# Patient Record
Sex: FEMALE | Race: WHITE
[De-identification: names, ages, dates, MRNs, and addresses within clinical notes are randomized per-mention and may not be internally consistent; named-entity substitution may affect disease eponyms.]

---

## 2020-02-14 ENCOUNTER — HOSPITAL ENCOUNTER (EMERGENCY)
Dept: HOSPITAL 74 - JER | Age: 30
Discharge: HOME | End: 2020-02-14
Payer: COMMERCIAL

## 2020-02-14 VITALS — HEART RATE: 76 BPM | DIASTOLIC BLOOD PRESSURE: 70 MMHG | SYSTOLIC BLOOD PRESSURE: 107 MMHG | TEMPERATURE: 97.9 F

## 2020-02-14 VITALS — BODY MASS INDEX: 27.4 KG/M2

## 2020-02-14 DIAGNOSIS — O04.6: ICD-10-CM

## 2020-02-14 DIAGNOSIS — N93.8: Primary | ICD-10-CM

## 2020-02-14 LAB
APPEARANCE UR: CLEAR
BACTERIA # UR AUTO: 13.7 /HPF
BASOPHILS # BLD: 0.4 % (ref 0–2)
BILIRUB UR STRIP.AUTO-MCNC: NEGATIVE MG/DL
CASTS URNS QL MICRO: 0 /LPF (ref 0–8)
COLOR UR: YELLOW
DEPRECATED RDW RBC AUTO: 13.4 % (ref 11.6–15.6)
EOSINOPHIL # BLD: 0.4 % (ref 0–4.5)
EPITH CASTS URNS QL MICRO: 1 /HPF
HCT VFR BLD CALC: 31.7 % (ref 32.4–45.2)
HGB BLD-MCNC: 10.9 GM/DL (ref 10.7–15.3)
KETONES UR QL STRIP: NEGATIVE
LEUKOCYTE ESTERASE UR QL STRIP.AUTO: NEGATIVE
LYMPHOCYTES # BLD: 25.7 % (ref 8–40)
MCH RBC QN AUTO: 30.6 PG (ref 25.7–33.7)
MCHC RBC AUTO-ENTMCNC: 34.3 G/DL (ref 32–36)
MCV RBC: 89.2 FL (ref 80–96)
MONOCYTES # BLD AUTO: 5.7 % (ref 3.8–10.2)
NEUTROPHILS # BLD: 67.8 % (ref 42.8–82.8)
NITRITE UR QL STRIP: NEGATIVE
PH UR: 8.5 [PH] (ref 5–8)
PLATELET # BLD AUTO: 294 K/MM3 (ref 134–434)
PMV BLD: 8 FL (ref 7.5–11.1)
PROT UR QL STRIP: NEGATIVE
PROT UR QL STRIP: NEGATIVE
RBC # BLD AUTO: 24 /HPF (ref 0–4)
RBC # BLD AUTO: 3.56 M/MM3 (ref 3.6–5.2)
SP GR UR: 1.01 (ref 1.01–1.03)
UROBILINOGEN UR STRIP-MCNC: 0.2 MG/DL (ref 0.2–1)
WBC # BLD AUTO: 8.6 K/MM3 (ref 4–10)
WBC # UR AUTO: 0 /HPF (ref 0–5)

## 2020-02-14 PROCEDURE — 3E033NZ INTRODUCTION OF ANALGESICS, HYPNOTICS, SEDATIVES INTO PERIPHERAL VEIN, PERCUTANEOUS APPROACH: ICD-10-PCS

## 2020-02-14 NOTE — PDOC
History of Present Illness





- General


History Source: Patient





- History of Present Illness


Initial Comments: 





20 12:47


30 y/o female with no significant PMHx here w/vaginal bleeding since Wednesday 

with golf ball sized clots 


S/p Misoprostol pill  last Friday/Saturday for 7 week confirmed 

pregnancy.


No fevers/chills.


Mild abdominal cramping.











<Cammie Fonseca - Last Filed: 02/15/20 08:18>





<Joanne oGodman - Last Filed: 02/15/20 10:20>





- General


Chief Complaint: Vaginal Bleeding


Stated Complaint: VAGINAL BLEEDING


Time Seen by Provider: 20 11:43





Past History





- Past Medical History


COPD: No





- Surgical History


Cholecystectomy: Yes





- Psycho Social/Smoking Cessation Hx


Smoking History: Never smoked


Have you smoked in the past 12 months: No


Hx Alcohol Use: No


Drug/Substance Use Hx: No


Substance Use Type: None





<Cammie Fonseca - Last Filed: 02/15/20 08:18>





<Joanne Goodman - Last Filed: 02/15/20 10:20>





- Past Medical History


Allergies/Adverse Reactions: 


 Allergies











Allergy/AdvReac Type Severity Reaction Status Date / Time


 


No Known Allergies Allergy   Verified 20 11:30














**Review of Systems





- Review of Systems


Constitutional: No: Chills, Fever


Respiratory: No: Cough, Shortness of Breath


Cardiac (ROS): No: Chest Pain, Lightheadedness, Palpitations, Syncope


ABD/GI: No: Constipated, Diarrhea, Nausea, Vomiting





<Cammie Fonseca - Last Filed: 02/15/20 08:18>





- Review of Systems


Able to Perform ROS?: Yes


ABD/GI: Yes: Abdominal cramping


: Yes: Symptoms Reported, See HPI, Pain, Other (vaginal bleeding)


Musculoskeletal: Yes: Back Pain


Integumentary: No: Change in Color, Pallor, Rash


Neurological: No: Headache


Hematologic/Lymphatic: No: Anemia, Blood Clots, Easy Bleeding, Easy Bruising, 

Bleeding Diathesis


All Other Systems: Reviewed and Negative





<Joanne Goodman - Last Filed: 02/15/20 10:20>





*Physical Exam





- Vital Signs


 Last Vital Signs











Temp Pulse Resp BP Pulse Ox


 


 98.4 F   66   16   107/63   99 


 


 02/14/20 11:31  20 11:31  20 11:31  20 11:31  20 11:31














- Physical Exam


Female Pelvic Exam: positive: vaginal bleeding (pooling dark red blood in 

vaginal vault, cervical os not visualized)


Musculoskeletal: negative: CVA Tenderness (R), CVA Tenderness (L)


Extremity: positive: Normal Capillary Refill, Normal Inspection


Integumentary: positive: Normal Color, Dry, Warm


Neurologic: positive: CNs II-XII NML intact, Fully Oriented, Alert





<MarianCammie - Last Filed: 02/15/20 08:18>





- Vital Signs


 Last Vital Signs











Temp Pulse Resp BP Pulse Ox


 


 97.9 F   76   16   107/70   99 


 


 20 17:12  20 17:12  20 17:12  20 17:12  20 17:12














- Physical Exam


General Appearance: Yes: Nourished, Appropriately Dressed


HEENT: positive: EOMI, MATT, Normal ENT Inspection


Neck: positive: Trachea midline.  negative: Tender


Respiratory/Chest: positive: Lungs Clear.  negative: Respiratory Distress


Cardiovascular: positive: Regular Rhythm, Regular Rate


Female Pelvic Exam: positive: normal external exam


Gastrointestinal/Abdominal: positive: Tender, Soft, Other (lower abdominal TTP)

.  negative: Guarding, Rebound


Musculoskeletal: positive: Normal Inspection.  negative: CVA Tenderness


Extremity: positive: Normal Range of Motion





<GoodmanJoannesarita Velázquez - Last Filed: 02/15/20 10:20>





ED Treatment Course





- LABORATORY


CBC & Chemistry Diagram: 


 20 13:37








<MarianCammie - Last Filed: 02/15/20 08:18>





- LABORATORY


CBC & Chemistry Diagram: 


 20 13:37








- ADDITIONAL ORDERS


Additional order review: 


 











  20





  13:37


 


RBC  3.56 L


 


MCV  89.2


 


MCHC  34.3


 


RDW  13.4


 


MPV  8.0


 


Neutrophils %  67.8


 


Lymphocytes %  25.7  D


 


Monocytes %  5.7


 


Eosinophils %  0.4  D


 


Basophils %  0.4














- Medications


Given in the ED: 


ED Medications














Discontinued Medications














Generic Name Dose Route Start Last Admin





  Trade Name Freq  PRN Reason Stop Dose Admin


 


Morphine Sulfate  4 mg  20 16:15  20 16:40





  Morphine Injection -  IVPUSH  20 16:16  4 mg





  ONCE ONE   Administration





     





     





     





     














<Joanne Goodman - Last Filed: 02/15/20 10:20>





Medical Decision Making





- Medical Decision Making





20 16:16


30 y/o female s/p pill  last Friday/Saturday here w/vaginal bleed.


VS unremarkable


Will test B-HCG for baseline, and TVUS to evaluate for retained POC.


CBC for blood loss requiring transfusion








20 16:46


Hb stable


Formal TVUS shows complex masslike density in cervix measuring 5.9 x 3.5 = 

retained POC vs.  in progress


Will discharge patient home w/instruction to return to ED in 48 hours for 

repeat B-HCG and TVUS











<Cammie Fonseca - Last Filed: 02/15/20 08:18>





Discharge





- Discharge Information


Problems reviewed: Yes





- Admission


No





<Cammie Fonseca - Last Filed: 02/15/20 08:18>





- Admission


No





<Joanne Goodman - Last Filed: 02/15/20 10:20>





- Discharge Information


Clinical Impression/Diagnosis: 


 Vaginal bleeding, , incomplete





Condition: Good


Disposition: HOME





- Patient Discharge Instructions


Patient Printed Discharge Instructions:  DI for Miscarriage


Additional Instructions: 


Return to the ED in 48 hours for a repeat blood draw and transvaginal ultrasound


It is important you return for this repeat testing.


Your care is not complete until you are evaluated in our ED in 48 hours.


Return before that time for any new/worsening/concerning symptoms.

## 2020-02-14 NOTE — PDOC
Documentation entered by Bella Hull SCRIBE, acting as scribe for Joanne Goodman MD.








Joanne Goodman MD:  This documentation has been prepared by the Kurtis harrington Nirvannie, SCRIBE, under my direction and personally reviewed by me in 

its entirety.  I confirm that the documentation accurately reflects all work, 

treatment, procedures, and medical decision making performed by me.  





Attending Attestation





- Resident


Resident Name: MarianCammie





- ED Attending Attestation


I have performed the following: I have examined & evaluated the patient, The 

case was reviewed & discussed with the resident, I agree w/resident's findings 

& plan, Exceptions are as noted





- HPI


HPI: 





20 14:09


30 y/o female with no significant PMHx here w/vaginal bleeding since Wednesday


S/p Misoprostol pill  last Friday/Saturday.





- Physicial Exam


PE: 





20 13:45


NAD, well appearing, EOMI, PERRL, MMM, nl conjunctiva, anicteric; neck supple. 

lungs clear, RRR, abdomen soft mild tenderness in lower abdomen but no rebound 

or guarding, no peritoneal findings.. Back nontender. LOTT x4, no focal neuro 

deficits. No peripheral edema. normal color for ethnicity, WWP.


pelvic exam done by resident see documentation.





02/15/20 10:24








- Medical Decision Making





20 14:09


Vital Signs











Temp Pulse Resp BP Pulse Ox


 


 98.4 F   66   16   107/63   99 


 


 20 11:31  20 11:31  20 11:31  20 11:31  20 11:31








DDx VB:  ectopic pregnancy, miscarriage, fetal demise, subchorionic hematoma, 

retained POC, normal first trimester bleeding, UTI in in pregnancy. Fibroid 

uterus, vaginitis, infection, electrolyte/metabolic derangements, anemia.





Rh positive, no rhogam indicated


VS wnl, normotensive, no tachy or hypoxia/respiratory distress. abdomen benign 

on reeval and no peritoneal findings, VB controlled


Beta hcg 1100


 TVUS shows complex masslike density in cervix measuring 5.9 x 3.5 = retained 

POC vs.  in progress


Will discharge patient home w/instruction to return to ED in 48 hours for 

repeat B-HCG and TVUS





Dispo: repeat beta and ultrasound in 2 days, given elevated hcg and s/p 

misoprostol, with TVUS showing complex density in the cervix so likely  

in progress hence the bleeding; bleeding precautions given, supportive care; 

return to ED sooner if persistent and heavy vaginal bleeding, persistent pelvic 

pain not relieved by your prescribed medications, dizziness, shortness of breath

, new and persistent fevers, other foul smelling discolored vaginal discharge, 

or for any other concerns. 


02/15/20 10:25





02/15/20 10:26